# Patient Record
Sex: MALE | Race: OTHER | HISPANIC OR LATINO | Employment: OTHER | URBAN - METROPOLITAN AREA
[De-identification: names, ages, dates, MRNs, and addresses within clinical notes are randomized per-mention and may not be internally consistent; named-entity substitution may affect disease eponyms.]

---

## 2023-01-14 ENCOUNTER — OFFICE VISIT (OUTPATIENT)
Dept: URGENT CARE | Age: 68
End: 2023-01-14

## 2023-01-14 VITALS
OXYGEN SATURATION: 98 % | SYSTOLIC BLOOD PRESSURE: 124 MMHG | BODY MASS INDEX: 25.32 KG/M2 | WEIGHT: 167.06 LBS | HEART RATE: 78 BPM | DIASTOLIC BLOOD PRESSURE: 73 MMHG | TEMPERATURE: 97.7 F | HEIGHT: 68 IN

## 2023-01-14 DIAGNOSIS — J06.9 ACUTE URI: ICD-10-CM

## 2023-01-14 DIAGNOSIS — U07.1 COVID-19: Primary | ICD-10-CM

## 2023-01-14 LAB
GLUCOSE SERPL-MCNC: 108 MG/DL (ref 65–140)
SARS-COV-2 AG UPPER RESP QL IA: POSITIVE
VALID CONTROL: ABNORMAL

## 2023-01-14 RX ORDER — ATORVASTATIN CALCIUM 20 MG/1
20 TABLET, FILM COATED ORAL
COMMUNITY

## 2023-01-14 RX ORDER — DAPAGLIFLOZIN AND METFORMIN HYDROCHLORIDE 5; 1000 MG/1; MG/1
TABLET, FILM COATED, EXTENDED RELEASE ORAL DAILY
COMMUNITY

## 2023-01-14 NOTE — PROGRESS NOTES
[facility]        NAME: Chet Greenwood is a 78 y o  male  : 1955    MRN: [id number]  DATE: 2023  TIME: 11:38 AM    Assessment and Plan   COVID-19 [U07 1]  1  COVID-19        2  Acute URI  Poct Covid 19 Rapid Antigen Test        COVID antigen positive  Patient Instructions     Please quarantine for total 5 days from symptom onset  Patient over-the-counter cough and cold medication as needed  Please  alternate ibuprofen and Tylenol as needed for fever, and ensure adequate fluid intake and urine output  Please trial warm salt water gargles, Chloraseptic spray, Cepacol cough drops and warm tea with honey as needed for sore throat  Follow up with PCP in 3-5 days  Proceed to  ER if symptoms worsen  Chief Complaint     Chief Complaint   Patient presents with   • Cough     Pt was taking Amoxicillin 500 mg Q 8 hrs and Acetaminophen since Wednesday(pt brought from [facility]  • Sore Throat         History of Present Illness       Patient presenting for evaluation of upper respiratory symptoms including cough, sore throat and congestion  Patient states that his symptoms started 3 days ago  He states that he has been taking nonprescription amoxicillin with minimal relief of the symptoms  He denies any chest pain, shortness of breath, fevers or chills  Patient states that he is vaccinated for COVID, and denies any recent sick contacts  Review of Systems   Review of Systems   Constitutional: Negative for chills and fever  HENT: Positive for congestion and sore throat  Respiratory: Positive for cough  Negative for shortness of breath  Cardiovascular: Negative for chest pain  Gastrointestinal: Negative for diarrhea, nausea and vomiting  All other systems reviewed and are negative          Current Medications       Current Outpatient Medications:   •  atorvastatin (LIPITOR) 20 mg tablet, Take 20 mg by mouth daily at bedtime, Disp: , Rfl:   • Dapagliflozin-metFORMIN HCl ER (Xigduo XR) 5-1000 MG TB24, Take by mouth daily, Disp: , Rfl:   •  LOSARTAN POTASSIUM PO, Take 25 mg by mouth daily, Disp: , Rfl:     Current Allergies     Allergies as of 01/14/2023   • (No Known Allergies)            The following portions of the patient's history were reviewed and updated as appropriate: allergies, current medications, past family history, past medical history, past social history, past surgical history and problem list      History reviewed  No pertinent past medical history  History reviewed  No pertinent surgical history  History reviewed  No pertinent family history  Medications have been verified  Objective   /73   Pulse 78   Temp 97 7 °F (36 5 °C) (Temporal)   Ht 5' 7 72" (1 72 m)   Wt 75 8 kg (167 lb 1 oz)   SpO2 98%   BMI 25 61 kg/m²        Physical Exam     Physical Exam  Vitals and nursing note reviewed  Constitutional:       General: He is not in acute distress  Appearance: Normal appearance  He is not ill-appearing, toxic-appearing or diaphoretic  HENT:      Head: Normocephalic and atraumatic  Right Ear: Tympanic membrane normal       Left Ear: Tympanic membrane normal       Nose: Congestion present  No rhinorrhea  Mouth/Throat:      Mouth: Mucous membranes are moist       Pharynx: Oropharynx is clear  Uvula midline  Posterior oropharyngeal erythema present  No oropharyngeal exudate or uvula swelling  Tonsils: No tonsillar exudate or tonsillar abscesses  0 on the right  0 on the left  Eyes:      General:         Right eye: No discharge  Left eye: No discharge  Cardiovascular:      Rate and Rhythm: Normal rate and regular rhythm  Pulses: Normal pulses  Heart sounds: Normal heart sounds  No murmur heard  No friction rub  No gallop  Pulmonary:      Effort: Pulmonary effort is normal  No respiratory distress  Breath sounds: Normal breath sounds  No stridor   No wheezing, rhonchi or rales  Abdominal:      General: Bowel sounds are normal       Palpations: Abdomen is soft  Tenderness: There is no abdominal tenderness  Skin:     General: Skin is warm and dry  Neurological:      Mental Status: He is alert     Psychiatric:         Mood and Affect: Mood normal

## 2023-01-14 NOTE — PATIENT INSTRUCTIONS
Please quarantine for total 5 days from symptom onset  Patient over-the-counter cough and cold medication as needed  Please  alternate ibuprofen and Tylenol as needed for fever, and ensure adequate fluid intake and urine output  Please trial warm salt water gargles, Chloraseptic spray, Cepacol cough drops and warm tea with honey as needed for sore throat